# Patient Record
Sex: FEMALE | Race: BLACK OR AFRICAN AMERICAN | NOT HISPANIC OR LATINO
[De-identification: names, ages, dates, MRNs, and addresses within clinical notes are randomized per-mention and may not be internally consistent; named-entity substitution may affect disease eponyms.]

---

## 2019-02-15 ENCOUNTER — APPOINTMENT (OUTPATIENT)
Dept: GASTROENTEROLOGY | Facility: CLINIC | Age: 64
End: 2019-02-15
Payer: COMMERCIAL

## 2019-02-15 VITALS
OXYGEN SATURATION: 99 % | BODY MASS INDEX: 40.29 KG/M2 | WEIGHT: 236 LBS | DIASTOLIC BLOOD PRESSURE: 72 MMHG | SYSTOLIC BLOOD PRESSURE: 112 MMHG | TEMPERATURE: 97.8 F | RESPIRATION RATE: 16 BRPM | HEART RATE: 81 BPM | HEIGHT: 64 IN

## 2019-02-15 DIAGNOSIS — Z12.11 ENCOUNTER FOR SCREENING FOR MALIGNANT NEOPLASM OF COLON: ICD-10-CM

## 2019-02-15 DIAGNOSIS — E66.9 OBESITY, UNSPECIFIED: ICD-10-CM

## 2019-02-15 PROCEDURE — 99214 OFFICE O/P EST MOD 30 MIN: CPT

## 2019-02-17 NOTE — REASON FOR VISIT
[Follow-Up: _____] : a [unfilled] follow-up visit [FreeTextEntry1] : in preparation for a screening colonoscopy.

## 2019-02-17 NOTE — ASSESSMENT
[FreeTextEntry1] : Impression:\par \par #1 colon cancer screening-patient asymptomatic from a GI standpoint. Average risk-denies family history of colon cancer. Normal screening colonoscopy performed on 2006 and on 6/1/11.\par \par #2 hypertension.\par \par #3 obesity-BMI 40.51.\par \par #4 status post hysterectomy-history of uterine fibroid.

## 2019-02-17 NOTE — HISTORY OF PRESENT ILLNESS
[FreeTextEntry1] : Office revisit on 2/15/19.\par \par The patient presents in preparation for a screening colonoscopy.\par \par The patient was previously evaluated for office consultation on 10/11/16.\par \par INITIAL CONSULTATION NOTE:\par \par Office consultation on 10/11/16.\par \par The patient is a 61-year-old woman who presents to discuss the timing of a screening colonoscopy. PMD: Dr. Fabian Staley.\par \par The patient is currently asymptomatic from a GI standpoint. She has one formed bowel movement daily without any complaints of diarrhea, constipation, change in bowel habit or rectal bleeding. Her appetite and weight are stable. She reports no complaints of dysphagia, heartburn, nausea, vomiting or abdominal pain.\par \par The patient reports that she had a normal colonoscopy in 2006. Per patient-report not available.\par \par Screening colonoscopy was performed on 6/1/11 which was normal. Polyps were not detected.\par \par The patient reports no family history of colon cancer.\par \par CURRENT HISTORY:\par \par ORV 2/15/19:     -Patient presents in preparation for screening colonoscopy. Asymptomatic from a GI standpoint at the current time except for occasional gaseous symptoms after eating "fast food". Appetite and weight are stable. Denies any complaints of dysphagia, heartburn, nausea, vomiting or abdominal pain. Has formed bowel movements every other day without complaints of diarrhea, constipation, change in bowel habit or rectal bleeding. Family history of colon cancer not reported. Patient indicates last colonoscopy was in 2011. No history of colonic polyps. Patient reports no history of digestive illness.

## 2019-02-17 NOTE — CONSULT LETTER
[Sid Roberts M.D.] : Sid Roberts M.D. [Division of Gastroenterology] : Division of Gastroenterology [[facility]] : [facility] [[address] : [address] [[place], [place] [address]] : [place] [address] [Dear  ___] : Dear  [unfilled], [Consult Letter:] : I had the pleasure of evaluating your patient, [unfilled]. [Please see my note below.] : Please see my note below. [Consult Closing:] : Thank you very much for allowing me to participate in the care of this patient.  If you have any questions, please do not hesitate to contact me. [Sincerely,] : Sincerely, [FreeTextEntry2] : Dr. Fabian Keller [FreeTextEntry3] : Sid Roberts M.D.

## 2019-02-17 NOTE — PHYSICAL EXAM
[General Appearance - Alert] : alert [General Appearance - In No Acute Distress] : in no acute distress [General Appearance - Well Developed] : well developed [General Appearance - Well-Appearing] : healthy appearing [Sclera] : the sclera and conjunctiva were normal [Oropharynx] : the oropharynx was normal [Neck Appearance] : the appearance of the neck was normal [Respiration, Rhythm And Depth] : normal respiratory rhythm and effort [Exaggerated Use Of Accessory Muscles For Inspiration] : no accessory muscle use [Auscultation Breath Sounds / Voice Sounds] : lungs were clear to auscultation bilaterally [Heart Rate And Rhythm] : heart rate was normal and rhythm regular [Heart Sounds] : normal S1 and S2 [Heart Sounds Gallop] : no gallops [Murmurs] : no murmurs [Heart Sounds Pericardial Friction Rub] : no pericardial rub [Edema] : there was no peripheral edema [Abdomen Soft] : soft [Abdomen Tenderness] : non-tender [] : no hepato-splenomegaly [Abdomen Mass (___ Cm)] : no abdominal mass palpated [Abdomen Hernia] : no hernia was discovered [Cervical Lymph Nodes Enlarged Posterior Bilaterally] : posterior cervical [Cervical Lymph Nodes Enlarged Anterior Bilaterally] : anterior cervical [Supraclavicular Lymph Nodes Enlarged Bilaterally] : supraclavicular [Abnormal Walk] : normal gait [Nail Clubbing] : no clubbing  or cyanosis of the fingernails [Skin Color & Pigmentation] : normal skin color and pigmentation [Oriented To Time, Place, And Person] : oriented to person, place, and time [Impaired Insight] : insight and judgment were intact [Affect] : the affect was normal [Mood] : the mood was normal [FreeTextEntry1] : The abdomen is obese, soft, nontender, nondistended and without mass or hepatosplenomegaly. A healed suprapubic midline scar is noted.

## 2019-04-09 DIAGNOSIS — R10.12 LEFT UPPER QUADRANT PAIN: ICD-10-CM

## 2019-05-14 ENCOUNTER — MEDICATION RENEWAL (OUTPATIENT)
Age: 64
End: 2019-05-14

## 2019-05-14 RX ORDER — SODIUM PICOSULFATE, MAGNESIUM OXIDE, AND ANHYDROUS CITRIC ACID 10; 3.5; 12 MG/160ML; G/160ML; G/160ML
10-3.5-12 MG-GM LIQUID ORAL
Qty: 1 | Refills: 0 | Status: ACTIVE | COMMUNITY
Start: 2019-02-15 | End: 1900-01-01

## 2019-05-20 ENCOUNTER — APPOINTMENT (OUTPATIENT)
Dept: GASTROENTEROLOGY | Facility: HOSPITAL | Age: 64
End: 2019-05-20

## 2019-05-20 ENCOUNTER — RESULT REVIEW (OUTPATIENT)
Age: 64
End: 2019-05-20

## 2019-05-20 ENCOUNTER — OUTPATIENT (OUTPATIENT)
Dept: OUTPATIENT SERVICES | Facility: HOSPITAL | Age: 64
LOS: 1 days | Discharge: HOME | End: 2019-05-20
Payer: COMMERCIAL

## 2019-05-20 DIAGNOSIS — Z12.11 ENCOUNTER FOR SCREENING FOR MALIGNANT NEOPLASM OF COLON: ICD-10-CM

## 2019-05-20 PROCEDURE — 88341 IMHCHEM/IMCYTCHM EA ADD ANTB: CPT | Mod: 26,59

## 2019-05-20 PROCEDURE — 88364 INSITU HYBRIDIZATION (FISH): CPT | Mod: 26

## 2019-05-20 PROCEDURE — 88367 INSITU HYBRIDIZATION AUTO: CPT | Mod: 26

## 2019-05-20 PROCEDURE — 88312 SPECIAL STAINS GROUP 1: CPT | Mod: 26

## 2019-05-20 PROCEDURE — 43239 EGD BIOPSY SINGLE/MULTIPLE: CPT

## 2019-05-20 PROCEDURE — 45380 COLONOSCOPY AND BIOPSY: CPT

## 2019-05-20 PROCEDURE — 88365 INSITU HYBRIDIZATION (FISH): CPT | Mod: 26,59

## 2019-05-20 PROCEDURE — 88305 TISSUE EXAM BY PATHOLOGIST: CPT | Mod: 26

## 2019-05-20 PROCEDURE — 88360 TUMOR IMMUNOHISTOCHEM/MANUAL: CPT | Mod: 26

## 2019-05-20 PROCEDURE — 88342 IMHCHEM/IMCYTCHM 1ST ANTB: CPT | Mod: 26,59

## 2019-05-23 LAB — SURGICAL PATHOLOGY STUDY: SIGNIFICANT CHANGE UP

## 2019-05-26 ENCOUNTER — OTHER (OUTPATIENT)
Age: 64
End: 2019-05-26

## 2020-09-14 ENCOUNTER — RESULT REVIEW (OUTPATIENT)
Age: 65
End: 2020-09-14

## 2022-10-21 ENCOUNTER — RESULT REVIEW (OUTPATIENT)
Age: 67
End: 2022-10-21